# Patient Record
Sex: MALE | Race: WHITE | Employment: FULL TIME | URBAN - METROPOLITAN AREA
[De-identification: names, ages, dates, MRNs, and addresses within clinical notes are randomized per-mention and may not be internally consistent; named-entity substitution may affect disease eponyms.]

---

## 2024-08-23 PROCEDURE — 99284 EMERGENCY DEPT VISIT MOD MDM: CPT

## 2024-08-24 ENCOUNTER — APPOINTMENT (OUTPATIENT)
Dept: CT IMAGING | Age: 44
End: 2024-08-24
Payer: COMMERCIAL

## 2024-08-24 ENCOUNTER — HOSPITAL ENCOUNTER (EMERGENCY)
Age: 44
Discharge: HOME | End: 2024-08-24
Attending: EMERGENCY MEDICINE
Payer: COMMERCIAL

## 2024-08-24 VITALS
SYSTOLIC BLOOD PRESSURE: 125 MMHG | RESPIRATION RATE: 13 BRPM | DIASTOLIC BLOOD PRESSURE: 86 MMHG | HEIGHT: 69 IN | TEMPERATURE: 99.7 F | WEIGHT: 217 LBS | HEART RATE: 84 BPM | BODY MASS INDEX: 32.14 KG/M2 | OXYGEN SATURATION: 94 %

## 2024-08-24 DIAGNOSIS — N20.1 URETEROLITHIASIS: Primary | ICD-10-CM

## 2024-08-24 LAB
ALBUMIN SERPL-MCNC: 4.4 G/DL (ref 3.4–5)
ALBUMIN/GLOB SERPL: 1.3 {RATIO} (ref 1.1–2.2)
ALP SERPL-CCNC: 89 U/L (ref 40–129)
ALT SERPL-CCNC: 19 U/L (ref 10–40)
ANION GAP SERPL CALCULATED.3IONS-SCNC: 10 MMOL/L (ref 3–16)
AST SERPL-CCNC: 16 U/L (ref 15–37)
BASOPHILS # BLD: 0.1 K/UL (ref 0–0.2)
BASOPHILS NFR BLD: 0.5 %
BILIRUB SERPL-MCNC: 0.5 MG/DL (ref 0–1)
BILIRUB UR QL STRIP.AUTO: ABNORMAL
BUN SERPL-MCNC: 14 MG/DL (ref 7–20)
CALCIUM SERPL-MCNC: 9.4 MG/DL (ref 8.3–10.6)
CHARACTER UR: ABNORMAL
CHLORIDE SERPL-SCNC: 100 MMOL/L (ref 99–110)
CLARITY UR: CLEAR
CO2 SERPL-SCNC: 28 MMOL/L (ref 21–32)
COLOR UR: YELLOW
CREAT SERPL-MCNC: 1.1 MG/DL (ref 0.9–1.3)
DEPRECATED RDW RBC AUTO: 12.5 % (ref 12.4–15.4)
EOSINOPHIL # BLD: 0 K/UL (ref 0–0.6)
EOSINOPHIL NFR BLD: 0.3 %
GFR SERPLBLD CREATININE-BSD FMLA CKD-EPI: 85 ML/MIN/{1.73_M2}
GLUCOSE SERPL-MCNC: 137 MG/DL (ref 70–99)
GLUCOSE UR STRIP.AUTO-MCNC: NEGATIVE MG/DL
HCT VFR BLD AUTO: 43.1 % (ref 40.5–52.5)
HGB BLD-MCNC: 14.6 G/DL (ref 13.5–17.5)
HGB UR QL STRIP.AUTO: ABNORMAL
KETONES UR STRIP.AUTO-MCNC: ABNORMAL MG/DL
LEUKOCYTE ESTERASE UR QL STRIP.AUTO: NEGATIVE
LIPASE SERPL-CCNC: 30 U/L (ref 13–60)
LYMPHOCYTES # BLD: 2 K/UL (ref 1–5.1)
LYMPHOCYTES NFR BLD: 16 %
MCH RBC QN AUTO: 30.8 PG (ref 26–34)
MCHC RBC AUTO-ENTMCNC: 33.8 G/DL (ref 31–36)
MCV RBC AUTO: 91.2 FL (ref 80–100)
MONOCYTES # BLD: 1 K/UL (ref 0–1.3)
MONOCYTES NFR BLD: 7.7 %
NEUTROPHILS # BLD: 9.5 K/UL (ref 1.7–7.7)
NEUTROPHILS NFR BLD: 75.5 %
NITRITE UR QL STRIP.AUTO: NEGATIVE
PH UR STRIP.AUTO: 6.5 [PH] (ref 5–8)
PLATELET # BLD AUTO: 288 K/UL (ref 135–450)
PMV BLD AUTO: 8.4 FL (ref 5–10.5)
POTASSIUM SERPL-SCNC: 4.2 MMOL/L (ref 3.5–5.1)
PROT SERPL-MCNC: 7.7 G/DL (ref 6.4–8.2)
PROT UR STRIP.AUTO-MCNC: ABNORMAL MG/DL
RBC # BLD AUTO: 4.73 M/UL (ref 4.2–5.9)
RBC #/AREA URNS HPF: >100 /HPF (ref 0–4)
SODIUM SERPL-SCNC: 138 MMOL/L (ref 136–145)
SP GR UR STRIP.AUTO: >=1.03 (ref 1–1.03)
UA COMPLETE W REFLEX CULTURE PNL UR: ABNORMAL
UA DIPSTICK W REFLEX MICRO PNL UR: YES
URN SPEC COLLECT METH UR: ABNORMAL
UROBILINOGEN UR STRIP-ACNC: 1 E.U./DL
WBC # BLD AUTO: 12.5 K/UL (ref 4–11)
WBC #/AREA URNS HPF: ABNORMAL /HPF (ref 0–5)

## 2024-08-24 PROCEDURE — 81001 URINALYSIS AUTO W/SCOPE: CPT

## 2024-08-24 PROCEDURE — 6370000000 HC RX 637 (ALT 250 FOR IP): Performed by: EMERGENCY MEDICINE

## 2024-08-24 PROCEDURE — 83690 ASSAY OF LIPASE: CPT

## 2024-08-24 PROCEDURE — 6360000002 HC RX W HCPCS

## 2024-08-24 PROCEDURE — 96374 THER/PROPH/DIAG INJ IV PUSH: CPT

## 2024-08-24 PROCEDURE — 80053 COMPREHEN METABOLIC PANEL: CPT

## 2024-08-24 PROCEDURE — 36415 COLL VENOUS BLD VENIPUNCTURE: CPT

## 2024-08-24 PROCEDURE — 74176 CT ABD & PELVIS W/O CONTRAST: CPT

## 2024-08-24 PROCEDURE — 2580000003 HC RX 258

## 2024-08-24 PROCEDURE — 85025 COMPLETE CBC W/AUTO DIFF WBC: CPT

## 2024-08-24 PROCEDURE — 96375 TX/PRO/DX INJ NEW DRUG ADDON: CPT

## 2024-08-24 RX ORDER — IBUPROFEN 800 MG/1
800 TABLET, FILM COATED ORAL EVERY 8 HOURS PRN
Qty: 30 TABLET | Refills: 0 | Status: SHIPPED | OUTPATIENT
Start: 2024-08-24

## 2024-08-24 RX ORDER — SODIUM CHLORIDE, SODIUM LACTATE, POTASSIUM CHLORIDE, AND CALCIUM CHLORIDE .6; .31; .03; .02 G/100ML; G/100ML; G/100ML; G/100ML
1000 INJECTION, SOLUTION INTRAVENOUS ONCE
Status: COMPLETED | OUTPATIENT
Start: 2024-08-24 | End: 2024-08-24

## 2024-08-24 RX ORDER — TAMSULOSIN HYDROCHLORIDE 0.4 MG/1
0.4 CAPSULE ORAL EVERY EVENING
Qty: 10 CAPSULE | Refills: 0 | Status: SHIPPED | OUTPATIENT
Start: 2024-08-24 | End: 2024-09-03

## 2024-08-24 RX ORDER — OXYCODONE AND ACETAMINOPHEN 5; 325 MG/1; MG/1
2 TABLET ORAL ONCE
Status: COMPLETED | OUTPATIENT
Start: 2024-08-24 | End: 2024-08-24

## 2024-08-24 RX ORDER — ONDANSETRON 4 MG/1
4 TABLET, ORALLY DISINTEGRATING ORAL 3 TIMES DAILY PRN
Qty: 15 TABLET | Refills: 0 | Status: SHIPPED | OUTPATIENT
Start: 2024-08-24 | End: 2024-08-29

## 2024-08-24 RX ORDER — KETOROLAC TROMETHAMINE 30 MG/ML
15 INJECTION, SOLUTION INTRAMUSCULAR; INTRAVENOUS ONCE
Status: COMPLETED | OUTPATIENT
Start: 2024-08-24 | End: 2024-08-24

## 2024-08-24 RX ORDER — ONDANSETRON 2 MG/ML
4 INJECTION INTRAMUSCULAR; INTRAVENOUS ONCE
Status: COMPLETED | OUTPATIENT
Start: 2024-08-24 | End: 2024-08-24

## 2024-08-24 RX ORDER — OXYCODONE AND ACETAMINOPHEN 5; 325 MG/1; MG/1
1 TABLET ORAL EVERY 4 HOURS PRN
Qty: 17 TABLET | Refills: 0 | Status: SHIPPED | OUTPATIENT
Start: 2024-08-24 | End: 2024-08-27

## 2024-08-24 RX ADMIN — KETOROLAC TROMETHAMINE 15 MG: 30 INJECTION, SOLUTION INTRAMUSCULAR at 01:12

## 2024-08-24 RX ADMIN — SODIUM CHLORIDE, POTASSIUM CHLORIDE, SODIUM LACTATE AND CALCIUM CHLORIDE 1000 ML: 600; 310; 30; 20 INJECTION, SOLUTION INTRAVENOUS at 01:18

## 2024-08-24 RX ADMIN — ONDANSETRON 4 MG: 2 INJECTION INTRAMUSCULAR; INTRAVENOUS at 01:11

## 2024-08-24 RX ADMIN — OXYCODONE HYDROCHLORIDE AND ACETAMINOPHEN 2 TABLET: 5; 325 TABLET ORAL at 02:06

## 2024-08-24 ASSESSMENT — PAIN DESCRIPTION - ORIENTATION: ORIENTATION: LEFT

## 2024-08-24 ASSESSMENT — PAIN DESCRIPTION - LOCATION
LOCATION: ABDOMEN
LOCATION: FLANK
LOCATION: ABDOMEN

## 2024-08-24 ASSESSMENT — LIFESTYLE VARIABLES
HOW OFTEN DO YOU HAVE A DRINK CONTAINING ALCOHOL: MONTHLY OR LESS
HOW MANY STANDARD DRINKS CONTAINING ALCOHOL DO YOU HAVE ON A TYPICAL DAY: 1 OR 2

## 2024-08-24 ASSESSMENT — PAIN - FUNCTIONAL ASSESSMENT: PAIN_FUNCTIONAL_ASSESSMENT: 0-10

## 2024-08-24 ASSESSMENT — PAIN DESCRIPTION - DESCRIPTORS: DESCRIPTORS: ACHING;STABBING

## 2024-08-24 ASSESSMENT — PAIN SCALES - GENERAL
PAINLEVEL_OUTOF10: 10
PAINLEVEL_OUTOF10: 4

## 2024-08-24 NOTE — ED PROVIDER NOTES
[facility]  ED     EMERGENCY DEPARTMENT ENCOUNTER     Location: [facility]  ED  8/23/2024  Note Started: 5:18 AM EDT 8/24/24      Patient Identification  Urbano Munson is a 44 y.o. male      HPI:Urbano Munson was evaluated in the Emergency Department for flank pain.  Patient reports left flank pain that came on abruptly this evening.  Prior history of kidney stones.  Vomited once prior to arrival.. Although initial history and physical exam information was obtained by LINDSEY/NPP/MD/DO (who also dictated a record of this visit), I personally saw the patient and performed and made/approved the management plan and take responsibility for the patient management.      PHYSICAL EXAM:  Left CVA tenderness.  Abdomen benign.        Patient seen and evaluated.  Relevant records reviewed.  MDM     Labs and imaging consistent with ureterolithiasis as a cause the patient's pain.  Patient is not in renal failure and I believe he is stable for discharge.  Pain significantly improved.  Advised return for new or worsening symptoms.    CLINICAL IMPRESSION  1. Ureterolithiasis          IRigoberto MD, am the primary clinician of record.       This chart was generated in part by using Dragon Dictation system and may contain errors related to that system including errors in grammar, punctuation, and spelling, as well as words and phrases that may be inappropriate. If there are any questions or concerns please feel free to contact the dictating provider for clarification.     Rigoberto Yoder MD   [facility]        Rigoberto Yoder MD  08/24/24 0519

## 2024-08-24 NOTE — ED PROVIDER NOTES
[facility]  ED  Emergency Department Encounter    Patient Name: Urbano Munson  MRN: [id number]  YOB: 1980  Date of Evaluation: 8/23/2024  Provider: Maikel Segal MD  Note Started: 12:20 PM EDT 8/24/24    CHIEF COMPLAINT  Flank Pain (Left sided flank pain. Hx kidney stones. Vomited x1. )    SHARED SERVICE VISIT   I have seen and evaluated this patient with my supervising physician, Dr. Yoder.    HISTORY OF PRESENT ILLNESS  History From: Patient    Limitations to history : None    Social Determinants Significantly Affecting Health : None    Urbano Munson is a 44 y.o. male who presents to the ED for evaluation of left flank pain onset approximately 6 hours prior.  Patient states that he has a history of kidney stones and started with left flank pain reminding him of his previous stones.  Patient states that the pain is progressively worsened and waxes and wanes in severity.  Patient admits to 1 episode of vomiting secondary to pain however does not admit to any residual nausea.  Patient denies any urinary complaints including burning and hematuria.  Patient denies any fevers or chills.  Patient denies chest pain and shortness of breath.    No other complaints, modifying factors or associated symptoms.     Nursing notes reviewed were all reviewed and agreed with or any disagreements were addressed in the HPI.    PMH:  Past Medical History:   Diagnosis Date    Back pain     Depression     Hyperlipidemia      Surgical History:  Past Surgical History:   Procedure Laterality Date    APPENDECTOMY      EYE SURGERY       Family History:  History reviewed. No pertinent family history.  Social History:  Social History     Socioeconomic History    Marital status: Single     Spouse name: Not on file    Number of children: Not on file    Years of education: Not on file    Highest education level: Not on file   Occupational History    Not on file   Tobacco Use    Smoking status: Never  for up to 3 days. Max Daily Amount: 6 tablets, Disp-17 tablet, R-0Normal      tamsulosin (FLOMAX) 0.4 MG capsule Take 1 capsule by mouth every evening for 10 days, Disp-10 capsule, R-0Normal      ondansetron (ZOFRAN-ODT) 4 MG disintegrating tablet Take 1 tablet by mouth 3 times daily as needed for Nausea or Vomiting, Disp-15 tablet, R-0Normal      !! ibuprofen (IBU) 600 MG tablet Take 1 tablet by mouth every 6 hours as needed for Pain for 5 days., Disp-20 tablet, R-0Print       !! - Potential duplicate medications found. Please discuss with provider.        Discontinued Medications:  Discharge Medication List as of 8/24/2024  2:34 AM        Risk management discussed and shared decision making had with patient and/or surrogate. All questions were answered. Patient will follow up with urology for further evaluation/treatment.  All questions answered.  Patient will return to ED for new/worsening symptoms.    DISPOSITION:  Patient was discharged.    (Please note that portions of this note were completed with a voice recognition program.  Efforts were made to edit the dictations but occasionally words are mis-transcribed).         Riley Yepez PA-C  08/24/24 0878

## 2024-08-24 NOTE — ED NOTES
Patient left via personal vehicle to private residence in stable condition. Patients vitals were assessed before discharge and were stable. Patient verbalized understanding of discharge instructions, follow up and medications. RN explained information in discharge packet and patient verbalized they have no questions at this time. Patient left ER with all paperwork and belongings that RN is aware of.